# Patient Record
Sex: MALE | Race: WHITE
[De-identification: names, ages, dates, MRNs, and addresses within clinical notes are randomized per-mention and may not be internally consistent; named-entity substitution may affect disease eponyms.]

---

## 2018-01-05 ENCOUNTER — HOSPITAL ENCOUNTER (EMERGENCY)
Dept: HOSPITAL 4 - SED | Age: 28
Discharge: HOME | End: 2018-01-05
Payer: MEDICAID

## 2018-01-05 VITALS — SYSTOLIC BLOOD PRESSURE: 119 MMHG

## 2018-01-05 VITALS — SYSTOLIC BLOOD PRESSURE: 118 MMHG

## 2018-01-05 VITALS — HEIGHT: 69 IN | BODY MASS INDEX: 25.18 KG/M2 | WEIGHT: 170 LBS

## 2018-01-05 DIAGNOSIS — J06.9: Primary | ICD-10-CM

## 2018-01-05 NOTE — NUR
Pt placed in chair for cough, sore throat, fever, n/v for the past 5 days. Pt 
was afebrile today in ER. No other injuries/complaints per pt or noted.